# Patient Record
Sex: FEMALE | Race: BLACK OR AFRICAN AMERICAN | NOT HISPANIC OR LATINO | URBAN - METROPOLITAN AREA
[De-identification: names, ages, dates, MRNs, and addresses within clinical notes are randomized per-mention and may not be internally consistent; named-entity substitution may affect disease eponyms.]

---
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
## 2023-11-19 NOTE — DISCHARGE NOTE OB - AVOID SITTING IN ONE POSITION FOR MORE THAN ONE HOUR
Only take antibiotics if you notice any increased redness or developed a fever as directed.   Statement Selected

## 2025-01-03 NOTE — PATIENT PROFILE OB - RESUSCITATION EFFORTS, BABY A
Patient febrile to 100.4 2/9. CXR clear, UA without evidence of infection. Blood cultures NGTD. UA neg.  - vanc/zosyn d/c, patient likely aspirated, switch to Unasyn 3g q6hr (7 days total 2/10-2/16) -> if d/c will discharge on augmentin   - blood cultures NGTD   - aspiration precautions  - bolus feeds in setting of aspiration 03-Jan-2025 01:25 No